# Patient Record
Sex: MALE | Race: WHITE | NOT HISPANIC OR LATINO | URBAN - METROPOLITAN AREA
[De-identification: names, ages, dates, MRNs, and addresses within clinical notes are randomized per-mention and may not be internally consistent; named-entity substitution may affect disease eponyms.]

---

## 2021-08-24 ENCOUNTER — APPOINTMENT (RX ONLY)
Dept: URBAN - METROPOLITAN AREA CLINIC 151 | Facility: CLINIC | Age: 46
Setting detail: DERMATOLOGY
End: 2021-08-24

## 2021-08-24 DIAGNOSIS — L05.91 PILONIDAL CYST WITHOUT ABSCESS: ICD-10-CM | Status: INADEQUATELY CONTROLLED

## 2021-08-24 DIAGNOSIS — Z71.89 OTHER SPECIFIED COUNSELING: ICD-10-CM

## 2021-08-24 PROCEDURE — 99204 OFFICE O/P NEW MOD 45 MIN: CPT

## 2021-08-24 PROCEDURE — ? PRESCRIPTION

## 2021-08-24 PROCEDURE — ? PRESCRIPTION MEDICATION MANAGEMENT

## 2021-08-24 PROCEDURE — ? COUNSELING

## 2021-08-24 PROCEDURE — ? FULL BODY SKIN EXAM - DECLINED

## 2021-08-24 RX ORDER — DOXYCYCLINE HYCLATE 100 MG/1
TABLET, COATED ORAL
Qty: 28 | Refills: 2 | Status: ERX | COMMUNITY
Start: 2021-08-24

## 2021-08-24 RX ADMIN — DOXYCYCLINE HYCLATE: 100 TABLET, COATED ORAL at 00:00

## 2021-08-24 ASSESSMENT — LOCATION SIMPLE DESCRIPTION DERM: LOCATION SIMPLE: LOWER BACK

## 2021-08-24 ASSESSMENT — LOCATION ZONE DERM: LOCATION ZONE: TRUNK

## 2021-08-24 ASSESSMENT — LOCATION DETAILED DESCRIPTION DERM: LOCATION DETAILED: SACRAL SPINE

## 2021-08-24 NOTE — PROCEDURE: PRESCRIPTION MEDICATION MANAGEMENT
Plan: Patient states that last week he was working outside and became very sweaty.  After that he noted a painful bump. Patient states that he soaked in the tub and states that he tried squeezing the bump to get stuff out of it. Patient states that made it worse so he placed a heating pad on the area and states that he did see improvements with that. Advised patient that the growth is a type of cyst that can track deep. Reassured patient that the cyst is a benign growth, advised patient that when the cyst drains it is often not an infectious process but very inflammatory.  Advised patient that an oral antibiotic doxycycline can be prescribed to help with the inflammation. Advised patient that a referral can be sent to general surgeon to remove the cyst. Patient states that he would like to try the doxycycline and would like to avoid surgery if he can.\\n\\nPatient will call back and request referral to general surgeon Scottie Mcwilliams MD in [place]. Plan: Patient states that last week he was working outside and became very sweaty.  After that he noted a painful bump. Patient states that he soaked in the tub and states that he tried squeezing the bump to get stuff out of it. Patient states that made it worse so he placed a heating pad on the area and states that he did see improvements with that. Advised patient that the growth is a type of cyst that can track deep. Reassured patient that the cyst is a benign growth, advised patient that when the cyst drains it is often not an infectious process but very inflammatory.  Advised patient that an oral antibiotic doxycycline can be prescribed to help with the inflammation. Advised patient that a referral can be sent to general surgeon to remove the cyst. Patient states that he would like to try the doxycycline and would like to avoid surgery if he can.\\n\\nPatient will call back and request referral to general surgeon Scottie Mcwilliams MD in [place].